# Patient Record
Sex: FEMALE | Race: WHITE | NOT HISPANIC OR LATINO | URBAN - METROPOLITAN AREA
[De-identification: names, ages, dates, MRNs, and addresses within clinical notes are randomized per-mention and may not be internally consistent; named-entity substitution may affect disease eponyms.]

---

## 2023-09-27 ENCOUNTER — APPOINTMENT (RX ONLY)
Dept: URBAN - METROPOLITAN AREA CLINIC 310 | Facility: CLINIC | Age: 28
Setting detail: DERMATOLOGY
End: 2023-09-27

## 2023-09-27 DIAGNOSIS — L91.8 OTHER HYPERTROPHIC DISORDERS OF THE SKIN: ICD-10-CM

## 2023-09-27 DIAGNOSIS — L74.51 PRIMARY FOCAL HYPERHIDROSIS: ICD-10-CM

## 2023-09-27 DIAGNOSIS — B35.3 TINEA PEDIS: ICD-10-CM

## 2023-09-27 PROBLEM — L74.513 PRIMARY FOCAL HYPERHIDROSIS, SOLES: Status: ACTIVE | Noted: 2023-09-27

## 2023-09-27 PROBLEM — D48.5 NEOPLASM OF UNCERTAIN BEHAVIOR OF SKIN: Status: ACTIVE | Noted: 2023-09-27

## 2023-09-27 PROCEDURE — ? COUNSELING

## 2023-09-27 PROCEDURE — ? BIOPSY BY SHAVE METHOD

## 2023-09-27 PROCEDURE — ? PRESCRIPTION

## 2023-09-27 PROCEDURE — ? ADDITIONAL NOTES

## 2023-09-27 PROCEDURE — 11102 TANGNTL BX SKIN SINGLE LES: CPT

## 2023-09-27 PROCEDURE — ? PRESCRIPTION MEDICATION MANAGEMENT

## 2023-09-27 PROCEDURE — 99203 OFFICE O/P NEW LOW 30 MIN: CPT | Mod: 25

## 2023-09-27 RX ORDER — ALUMINUM CHLORIDE 20 %
SOLUTION, NON-ORAL TOPICAL
Qty: 37.5 | Refills: 11 | Status: ERX | COMMUNITY
Start: 2023-09-27

## 2023-09-27 RX ORDER — SULCONAZOLE NITRATE 10 MG/G
CREAM TOPICAL
Qty: 60 | Refills: 2 | Status: ERX | COMMUNITY
Start: 2023-09-27

## 2023-09-27 RX ADMIN — Medication: at 00:00

## 2023-09-27 RX ADMIN — SULCONAZOLE NITRATE: 10 CREAM TOPICAL at 00:00

## 2023-09-27 ASSESSMENT — LOCATION ZONE DERM
LOCATION ZONE: FEET
LOCATION ZONE: TRUNK

## 2023-09-27 ASSESSMENT — LOCATION DETAILED DESCRIPTION DERM
LOCATION DETAILED: RIGHT PLANTAR FOREFOOT OVERLYING 2ND METATARSAL
LOCATION DETAILED: LEFT MEDIAL PLANTAR MIDFOOT
LOCATION DETAILED: LEFT PLANTAR FOREFOOT OVERLYING 3RD METATARSAL
LOCATION DETAILED: RIGHT AREOLA
LOCATION DETAILED: RIGHT MEDIAL PLANTAR MIDFOOT

## 2023-09-27 ASSESSMENT — LOCATION SIMPLE DESCRIPTION DERM
LOCATION SIMPLE: RIGHT BREAST
LOCATION SIMPLE: LEFT PLANTAR SURFACE
LOCATION SIMPLE: RIGHT PLANTAR SURFACE

## 2023-09-27 NOTE — PROCEDURE: ADDITIONAL NOTES
Additional Notes: Offered shave removal biopsy vs excision with plastics. Patient opts for shave removal today.
Render Risk Assessment In Note?: yes
Detail Level: Simple
Additional Notes: Recommended Qbrexa vs Aluminum Chloride vs Glycopyrrolate. Patient opts for trial of aluminum chloride.

## 2023-09-27 NOTE — PROCEDURE: PRESCRIPTION MEDICATION MANAGEMENT
Initiate Treatment: Exelderm 1 % topical cream to affected area on feet BID until clear.  Once clear, use for 1 more week then stop.
Detail Level: Zone
Render In Strict Bullet Format?: No
Initiate Treatment: Drysol 20 % topical solution to affected areas on feet nightly until controlled than can decrease to 2-3 times a week PRN

## 2024-05-02 ENCOUNTER — APPOINTMENT (RX ONLY)
Dept: URBAN - METROPOLITAN AREA CLINIC 310 | Facility: CLINIC | Age: 29
Setting detail: DERMATOLOGY
End: 2024-05-02

## 2024-05-02 DIAGNOSIS — L74.51 PRIMARY FOCAL HYPERHIDROSIS: ICD-10-CM | Status: INADEQUATELY CONTROLLED

## 2024-05-02 PROBLEM — L74.513 PRIMARY FOCAL HYPERHIDROSIS, SOLES: Status: ACTIVE | Noted: 2024-05-02

## 2024-05-02 PROBLEM — L74.512 PRIMARY FOCAL HYPERHIDROSIS, PALMS: Status: ACTIVE | Noted: 2024-05-02

## 2024-05-02 PROCEDURE — ? PRESCRIPTION

## 2024-05-02 PROCEDURE — ? COUNSELING

## 2024-05-02 PROCEDURE — ? ADDITIONAL NOTES

## 2024-05-02 PROCEDURE — 99213 OFFICE O/P EST LOW 20 MIN: CPT

## 2024-05-02 PROCEDURE — ? PRESCRIPTION MEDICATION MANAGEMENT

## 2024-05-02 RX ORDER — GLYCOPYRROLATE 1 MG/1
TABLET ORAL
Qty: 45 | Refills: 11 | Status: ERX | COMMUNITY
Start: 2024-05-02

## 2024-05-02 RX ADMIN — GLYCOPYRROLATE: 1 TABLET ORAL at 00:00

## 2024-05-02 ASSESSMENT — LOCATION SIMPLE DESCRIPTION DERM
LOCATION SIMPLE: LEFT HAND
LOCATION SIMPLE: RIGHT PLANTAR SURFACE
LOCATION SIMPLE: LEFT PLANTAR SURFACE
LOCATION SIMPLE: RIGHT HAND

## 2024-05-02 ASSESSMENT — LOCATION ZONE DERM
LOCATION ZONE: FEET
LOCATION ZONE: HAND

## 2024-05-02 ASSESSMENT — LOCATION DETAILED DESCRIPTION DERM
LOCATION DETAILED: LEFT MEDIAL PLANTAR MIDFOOT
LOCATION DETAILED: RIGHT ULNAR PALM
LOCATION DETAILED: RIGHT MEDIAL PLANTAR MIDFOOT
LOCATION DETAILED: LEFT RADIAL PALM

## 2024-05-02 NOTE — PROCEDURE: ADDITIONAL NOTES
Additional Notes: Discussed treatment options to include Qbrexa vs Aluminum Chloride vs Glycopyrrolate along with the risks and benefits associated with each. Patient opts for Gylcopyrrolate.
Detail Level: Simple
Render Risk Assessment In Note?: yes

## 2024-05-02 NOTE — PROCEDURE: PRESCRIPTION MEDICATION MANAGEMENT
Detail Level: Zone
Discontinue Regimen: Drysol 20 %
Initiate Treatment: glycopyrrolate 1 mg tablet - Take 1 tab po QD-BID PRN hyperhidrosis
Render In Strict Bullet Format?: No

## 2024-11-06 ENCOUNTER — APPOINTMENT (RX ONLY)
Dept: URBAN - METROPOLITAN AREA CLINIC 373 | Facility: CLINIC | Age: 29
Setting detail: DERMATOLOGY
End: 2024-11-06

## 2024-11-06 DIAGNOSIS — L72.8 OTHER FOLLICULAR CYSTS OF THE SKIN AND SUBCUTANEOUS TISSUE: ICD-10-CM | Status: WORSENING

## 2024-11-06 PROCEDURE — ? ASC CONSULTATION

## 2024-11-06 PROCEDURE — ? DEFER

## 2024-11-06 PROCEDURE — ? COUNSELING

## 2024-11-06 PROCEDURE — 99213 OFFICE O/P EST LOW 20 MIN: CPT

## 2024-11-06 ASSESSMENT — LOCATION SIMPLE DESCRIPTION DERM: LOCATION SIMPLE: GROIN

## 2024-11-06 ASSESSMENT — LOCATION ZONE DERM: LOCATION ZONE: VULVA

## 2024-11-06 ASSESSMENT — LOCATION DETAILED DESCRIPTION DERM: LOCATION DETAILED: MONS PUBIS

## 2024-11-06 NOTE — HPI: EVALUATION OF SKIN LESION(S)
Hpi Title: Evaluation of a Skin Lesion
Have Your Spot(S) Been Treated In The Past?: has not been treated
Additional History: PT PRESENTS FOR LESION ON VAGINA, PT STATES THAT LESION LEAKED WHITE FLUIDS WHEN PRESSURE WAS APPLIED

## 2025-05-13 ENCOUNTER — APPOINTMENT (OUTPATIENT)
Dept: URBAN - METROPOLITAN AREA CLINIC 312 | Facility: CLINIC | Age: 30
Setting detail: DERMATOLOGY
End: 2025-05-13

## 2025-05-13 DIAGNOSIS — L81.4 OTHER MELANIN HYPERPIGMENTATION: ICD-10-CM | Status: STABLE

## 2025-05-13 DIAGNOSIS — D18.0 HEMANGIOMA: ICD-10-CM | Status: STABLE

## 2025-05-13 DIAGNOSIS — D22 MELANOCYTIC NEVI: ICD-10-CM | Status: UNCHANGED

## 2025-05-13 DIAGNOSIS — L81.7 PIGMENTED PURPURIC DERMATOSIS: ICD-10-CM | Status: STABLE

## 2025-05-13 PROBLEM — D22.5 MELANOCYTIC NEVI OF TRUNK: Status: ACTIVE | Noted: 2025-05-13

## 2025-05-13 PROBLEM — D22.121 MELANOCYTIC NEVI OF LEFT UPPER EYELID, INCLUDING CANTHUS: Status: ACTIVE | Noted: 2025-05-13

## 2025-05-13 PROBLEM — D18.01 HEMANGIOMA OF SKIN AND SUBCUTANEOUS TISSUE: Status: ACTIVE | Noted: 2025-05-13

## 2025-05-13 PROCEDURE — ? FULL BODY SKIN EXAM

## 2025-05-13 PROCEDURE — ? TREATMENT REGIMEN

## 2025-05-13 PROCEDURE — ? PATIENT SPECIFIC COUNSELING

## 2025-05-13 PROCEDURE — 99213 OFFICE O/P EST LOW 20 MIN: CPT

## 2025-05-13 PROCEDURE — ? COUNSELING

## 2025-05-13 PROCEDURE — ? ADDITIONAL NOTES

## 2025-05-13 ASSESSMENT — LOCATION ZONE DERM
LOCATION ZONE: ARM
LOCATION ZONE: TRUNK
LOCATION ZONE: LEG
LOCATION ZONE: EYELID

## 2025-05-13 ASSESSMENT — LOCATION DETAILED DESCRIPTION DERM
LOCATION DETAILED: LEFT MEDIAL SUPERIOR EYELID
LOCATION DETAILED: RIGHT DISTAL DORSAL FOREARM
LOCATION DETAILED: INFERIOR THORACIC SPINE
LOCATION DETAILED: PERIUMBILICAL SKIN
LOCATION DETAILED: LEFT LATERAL DISTAL PRETIBIAL REGION
LOCATION DETAILED: LEFT DISTAL DORSAL FOREARM

## 2025-05-13 ASSESSMENT — LOCATION SIMPLE DESCRIPTION DERM
LOCATION SIMPLE: LEFT SUPERIOR EYELID
LOCATION SIMPLE: RIGHT FOREARM
LOCATION SIMPLE: LEFT FOREARM
LOCATION SIMPLE: UPPER BACK
LOCATION SIMPLE: ABDOMEN
LOCATION SIMPLE: LEFT PRETIBIAL REGION

## 2025-05-13 NOTE — HPI: EVALUATION OF SKIN LESION(S)
What Type Of Note Output Would You Prefer (Optional)?: Bullet Format
Hpi Title: Evaluation of Skin Lesions
Have Your Spot(S) Been Treated In The Past?: has not been treated
Year Removed: 1900
Additional History: Pt here for fbse. She reports a growth above left eye that she would like removed.

## 2025-05-13 NOTE — HPI: SECONDARY COMPLAINT
Additional History: Pt states that has a red rash on lower left calf that has been present for 2 years. It had no texture and is never itchy.

## 2025-05-13 NOTE — PROCEDURE: PATIENT SPECIFIC COUNSELING
DISCUSSED REMOVAL OPTIONS AND POTENTIAL RESIDUAL SCARRING.  PT OPTS FOR NO INTERVENTION.
Detail Level: Detailed

## 2025-08-25 RX ORDER — GLYCOPYRROLATE 1 MG/1
TABLET ORAL
Qty: 45 | Refills: 8 | Status: ERX